# Patient Record
Sex: FEMALE | Race: WHITE | NOT HISPANIC OR LATINO | URBAN - METROPOLITAN AREA
[De-identification: names, ages, dates, MRNs, and addresses within clinical notes are randomized per-mention and may not be internally consistent; named-entity substitution may affect disease eponyms.]

---

## 2017-05-09 ENCOUNTER — IMPORTED ENCOUNTER (OUTPATIENT)
Dept: URBAN - METROPOLITAN AREA CLINIC 1 | Facility: CLINIC | Age: 42
End: 2017-05-09

## 2017-05-09 PROBLEM — H52.13: Noted: 2017-05-09

## 2017-05-09 PROCEDURE — S0621 ROUTINE OPHTHALMOLOGICAL EXA: HCPCS

## 2017-05-09 NOTE — PATIENT DISCUSSION
1. Myopia OU- Rx for glasses/ CLs given. Return for an appointment in 1 yr 40/cc with Dr. Malik Auguste.

## 2018-01-29 NOTE — PATIENT DISCUSSION
(C53.1873) Primary open-angle glaucoma bilateral moderate stage - Assesment : Examination revealed Primary Open Angle Glaucoma. OCT ONH and HVF today. IOP 13/14. s/p SLT     Lost to f/u - Plan : Monitor closely for IOP and NFL changes with visits and testing. Discussed importance of monitoring q 6 months. RTC in 6 months for IOP Check and OCT ONH, sooner if problems or changes occur.

## 2018-01-29 NOTE — PATIENT DISCUSSION
(K24.660) Vitreous degeneration, bilateral - Assesment : Examination revealed PVD floater OU. No changes reported. No holes or tears noted. - Plan : Monitor for changes. Advised patient to call our office with decreased vision or an increase in flashes and/or floaters.

## 2018-05-14 ENCOUNTER — IMPORTED ENCOUNTER (OUTPATIENT)
Dept: URBAN - METROPOLITAN AREA CLINIC 1 | Facility: CLINIC | Age: 43
End: 2018-05-14

## 2018-05-14 PROBLEM — H52.13: Noted: 2018-05-14

## 2018-05-14 PROCEDURE — S0621 ROUTINE OPHTHALMOLOGICAL EXA: HCPCS

## 2018-05-14 NOTE — PATIENT DISCUSSION
1. Myopia: Rx was given for correction if indicated and requested. Finalized [facility] for an appointment in 1 year 40/cc with Dr. Brad Holliday.

## 2019-09-05 ENCOUNTER — IMPORTED ENCOUNTER (OUTPATIENT)
Dept: URBAN - METROPOLITAN AREA CLINIC 1 | Facility: CLINIC | Age: 44
End: 2019-09-05

## 2019-09-05 PROBLEM — H52.13: Noted: 2019-09-05

## 2019-09-05 PROCEDURE — S0621 ROUTINE OPHTHALMOLOGICAL EXA: HCPCS

## 2019-09-05 NOTE — PATIENT DISCUSSION
1. Myopia: Rx was given for correction if indicated and requested. Finalized [facility] for an appointment in 1 year 40/cc with Dr. Michael Zhong.

## 2020-09-08 ENCOUNTER — IMPORTED ENCOUNTER (OUTPATIENT)
Dept: URBAN - METROPOLITAN AREA CLINIC 1 | Facility: CLINIC | Age: 45
End: 2020-09-08

## 2020-09-08 PROBLEM — H52.13: Noted: 2020-09-08

## 2020-09-08 PROCEDURE — S0621 ROUTINE OPHTHALMOLOGICAL EXA: HCPCS

## 2020-09-08 NOTE — PATIENT DISCUSSION
Myopia: Rx was given for correction if indicated and requested. Finalized [facility] Mccord for an appointment in 1 year 40/cc with Dr. Naresh Gordon.

## 2022-04-02 ASSESSMENT — VISUAL ACUITY
OD_SC: 20/20
OS_SC: 20/20
OS_SC: 20/20
OD_SC: 20/20
OS_SC: 20/20
OD_CC: J1
OS_SC: 20/20
OS_CC: J1
OD_CC: J1
OD_SC: 20/20
OS_CC: J1
OD_CC: J1+
OD_SC: 20/20
OD_CC: J1+
OS_CC: J1+
OS_CC: J1+

## 2022-04-02 ASSESSMENT — TONOMETRY
OS_IOP_MMHG: 14
OD_IOP_MMHG: 13
OS_IOP_MMHG: 12
OS_IOP_MMHG: 14
OD_IOP_MMHG: 14
OS_IOP_MMHG: 13
OD_IOP_MMHG: 12
OD_IOP_MMHG: 14

## 2022-09-09 ENCOUNTER — COMPREHENSIVE EXAM (OUTPATIENT)
Dept: URBAN - METROPOLITAN AREA CLINIC 1 | Facility: CLINIC | Age: 47
End: 2022-09-09

## 2022-09-09 DIAGNOSIS — H52.13: ICD-10-CM

## 2022-09-09 PROCEDURE — 92310 CONTACT LENS FITTING OU: CPT

## 2022-09-09 PROCEDURE — 92014 COMPRE OPH EXAM EST PT 1/>: CPT

## 2022-09-09 ASSESSMENT — VISUAL ACUITY
OD_CC: 20/20
OD_CC: J1+
OS_CC: 20/20
OD_CC: 20/20
OS_CC: J1+
OS_CC: 20/20

## 2022-09-09 ASSESSMENT — TONOMETRY
OD_IOP_MMHG: 14
OS_IOP_MMHG: 14